# Patient Record
Sex: FEMALE | Race: WHITE | NOT HISPANIC OR LATINO | URBAN - METROPOLITAN AREA
[De-identification: names, ages, dates, MRNs, and addresses within clinical notes are randomized per-mention and may not be internally consistent; named-entity substitution may affect disease eponyms.]

---

## 2022-02-24 ENCOUNTER — OFFICE VISIT (OUTPATIENT)
Dept: GASTROENTEROLOGY | Facility: CLINIC | Age: 67
End: 2022-02-24
Payer: COMMERCIAL

## 2022-02-24 VITALS
HEIGHT: 66 IN | DIASTOLIC BLOOD PRESSURE: 78 MMHG | SYSTOLIC BLOOD PRESSURE: 126 MMHG | BODY MASS INDEX: 22.79 KG/M2 | WEIGHT: 141.8 LBS

## 2022-02-24 DIAGNOSIS — Z12.11 SCREENING FOR COLON CANCER: ICD-10-CM

## 2022-02-24 DIAGNOSIS — K57.20 PERFORATED DIVERTICULUM OF LARGE INTESTINE: Primary | ICD-10-CM

## 2022-02-24 DIAGNOSIS — C15.9 ESOPHAGEAL ADENOCARCINOMA (HCC): ICD-10-CM

## 2022-02-24 PROCEDURE — 99214 OFFICE O/P EST MOD 30 MIN: CPT | Performed by: REGISTERED NURSE

## 2022-02-24 RX ORDER — APIXABAN 5 MG/1
5 TABLET, FILM COATED ORAL 2 TIMES DAILY
COMMUNITY
Start: 2022-01-26

## 2022-02-24 RX ORDER — POLYETHYLENE GLYCOL 3350 17 G/17G
17 POWDER, FOR SOLUTION ORAL DAILY
COMMUNITY

## 2022-02-24 RX ORDER — POTASSIUM CHLORIDE 750 MG/1
40 CAPSULE, EXTENDED RELEASE ORAL DAILY
COMMUNITY
Start: 2021-12-06

## 2022-02-24 RX ORDER — ACETAMINOPHEN 325 MG/1
TABLET ORAL
COMMUNITY
Start: 2021-12-06

## 2022-02-24 RX ORDER — SUCRALFATE 1 G/1
1 TABLET ORAL
COMMUNITY
Start: 2022-02-22 | End: 2022-03-28 | Stop reason: SDUPTHER

## 2022-02-24 NOTE — PROGRESS NOTES
[id number] - Outpatient Follow-up Note  Saira Kauffman 74 y o  female MRN: [id number]  Encounter: [id number]    ASSESSMENT AND PLAN:      2  Esophageal adenocarcinoma ([address] )  Diagnosed by biopsy during EGD  She is currently being followed by [facility] receiving chemotherapy as well as radiation  Repeat scan a couple months after treatment is finished  EGD recall in about a year from now  Patient denies any dysphagia, odynophagia  Her appetite is good her weight is stable  If potential for surgery in the future may have to consider PEG however at current situation this is not necessary  2  Perforated diverticulum of large intestine  Status post Hay procedure 10/30/2021  She does have a colostomy with hopes for reversal in the future  She will need a colonoscopy prior to reversal         3  Screening for colon cancer  Previous colonoscopy in 2007  No colonoscopy is there after  She will need a colonoscopy eventually prior to colostomy reversal       Followup Appointment:  3 months  ______________________________________________________________________    Chief Complaint   Patient presents with    Follow-up hospital -  bleeding ulcer     HPI:  58-year-old female presents for follow-up [facility]   She was admitted the end of October/2021 with perforated diverticulitis of the sigmoid  She is status post Hay procedure with a colostomy  She was started on 5 mg Eliquis and it was noted she needed to be on for 6 months and then possible transition to 2 5 mg afterwards  11/16 she was admitted to the hospital with anemia and hematemesis in the setting of a PE  Hemoglobin at that time was 6 1  She had an EGD on 11/23 that showed an esophageal mass and long segment Barretts  Biopsies were performed that showed adenocarcinoma  Patient is being followed by [facility]    She is to receive 28 sessions of radiation total   She is currently at session 20  She is also getting chemotherapy once weekly  She will have her 5th out of 6 chemo treatment tomorrow  She will then have a repeat scan few months later  In regards to the perforated diverticulitis she was told that she will be able to have a colostomy reversal however it is important to finish the treatment for the esophageal mass 1st   Discussed with patient that we will want to do a recall EGD in about a year and she will need a colonoscopy prior to reversal of colostomy  Patient states that her appetite is good and her weight has stayed stable  She denies any dysphagia, odynophagia, nausea, vomiting  She denies any abdominal pain  She states that the stools in her colostomy are soft and slight liquid with MiraLax titrated      Historical Information   Past Medical History:   Diagnosis Date    Diverticulitis of colon     DVT (deep venous thrombosis) (HCC)     Esophageal cancer (HCC)     Esophageal ulcer     Pulmonary embolism (HCC)      Past Surgical History:   Procedure Laterality Date    COLOSTOMY      MYOMECTOMY       Social History     Substance and Sexual Activity   Alcohol Use Not Currently     Social History     Substance and Sexual Activity   Drug Use Not on file     Social History     Tobacco Use   Smoking Status Former Smoker   Smokeless Tobacco Never Used     Family History   Problem Relation Age of Onset    Cancer Mother     Stroke Father     Dementia Father     Colon cancer Neg Hx     Colon polyps Neg Hx          Current Outpatient Medications:     acetaminophen (Tylenol) 325 mg tablet    Eliquis 5 MG    magnesium oxide (MAG-OX) 400 mg    metoprolol tartrate (LOPRESSOR) 25 mg tablet    metoprolol tartrate (LOPRESSOR) 25 mg tablet    Pantoprazole Sodium (PROTONIX PO)    polyethylene glycol (GLYCOLAX) 17 GM/SCOOP powder    potassium chloride (MICRO-K) 10 MEQ CR capsule    sucralfate (CARAFATE) 1 g tablet  No Known Allergies  Reviewed medications and allergies and updated as indicated    PHYSICAL EXAM:    Blood pressure 126/78, height 5' 6" (1 676 m), weight 64 3 kg (141 lb 12 8 oz)  Body mass index is 22 89 kg/m²  General Appearance: NAD, cooperative, alert  Eyes: Anicteric, PERRLA, EOMI  ENT:  Normocephalic, atraumatic, normal mucosa  Neck:  Supple, symmetrical, trachea midline  Resp:  Clear to auscultation bilaterally; no rales, rhonchi or wheezing; respirations unlabored   CV:  S1 S2, Regular rate and rhythm; no murmur, rub, or gallop  GI:  Soft, non-tender, non-distended; normal bowel sounds; no masses, no organomegaly   Rectal: Deferred  Musculoskeletal: No cyanosis, clubbing or edema  Normal ROM  Skin:  No jaundice, rashes, or lesions   Heme/Lymph: No palpable cervical lymphadenopathy  Psych: Normal affect, good eye contact  Neuro: No gross deficits, AAOx3    Lab Results:   Reviewed    Radiology Results:   No results found

## 2022-03-28 ENCOUNTER — TELEPHONE (OUTPATIENT)
Dept: GASTROENTEROLOGY | Facility: CLINIC | Age: 67
End: 2022-03-28

## 2022-03-28 DIAGNOSIS — K21.9 GASTROESOPHAGEAL REFLUX DISEASE, UNSPECIFIED WHETHER ESOPHAGITIS PRESENT: Primary | ICD-10-CM

## 2022-03-28 RX ORDER — SUCRALFATE 1 G/1
1 TABLET ORAL
Qty: 120 TABLET | Refills: 5 | Status: SHIPPED | OUTPATIENT
Start: 2022-03-28

## 2022-03-28 NOTE — TELEPHONE ENCOUNTER
Pt's , Ruma, left  mss asking if she can refill Sucralfate?/just saw Kael Ordonez   # [id number]  Rx did not go through

## 2022-03-30 NOTE — TELEPHONE ENCOUNTER
Pt's , Ruma, left [facility] asking for [phone] to f/u on req to renew med; called a couple days ago  Ret'd call; advised Rx for Sucralfate was sent to [facility] 3/28

## 2022-04-22 ENCOUNTER — TELEPHONE (OUTPATIENT)
Dept: GASTROENTEROLOGY | Facility: CLINIC | Age: 67
End: 2022-04-22

## 2022-04-22 DIAGNOSIS — K21.9 GASTROESOPHAGEAL REFLUX DISEASE, UNSPECIFIED WHETHER ESOPHAGITIS PRESENT: Primary | ICD-10-CM

## 2022-04-22 RX ORDER — PANTOPRAZOLE SODIUM 40 MG/1
40 TABLET, DELAYED RELEASE ORAL DAILY
Qty: 90 TABLET | Refills: 3 | Status: SHIPPED | OUTPATIENT
Start: 2022-04-22 | End: 2022-07-21

## 2022-04-22 NOTE — TELEPHONE ENCOUNTER
I spoke with patient, she would like a 90 day supply  Last seen in [facility] December 2021  Last seen 2/24/22 and her follow up appointment is 5/23/22 with Crow Salcido

## 2022-04-22 NOTE — TELEPHONE ENCOUNTER
Pt's , Ruma, states when she was in [address] [place] [address] she rec'd Rx from surgeon Dr Steve Nicholas for Pantoprazole 40 mg once daily  She is almost out; requests new Rx from GI to /aCm  If Mumtaz Ly # [id number]

## 2022-05-23 ENCOUNTER — OFFICE VISIT (OUTPATIENT)
Dept: GASTROENTEROLOGY | Facility: CLINIC | Age: 67
End: 2022-05-23
Payer: COMMERCIAL

## 2022-05-23 VITALS
SYSTOLIC BLOOD PRESSURE: 120 MMHG | BODY MASS INDEX: 22.5 KG/M2 | DIASTOLIC BLOOD PRESSURE: 72 MMHG | HEIGHT: 66 IN | HEART RATE: 50 BPM | WEIGHT: 140 LBS

## 2022-05-23 DIAGNOSIS — C15.9 ESOPHAGEAL ADENOCARCINOMA (HCC): Primary | ICD-10-CM

## 2022-05-23 DIAGNOSIS — K57.20 PERFORATED DIVERTICULUM OF LARGE INTESTINE: ICD-10-CM

## 2022-05-23 DIAGNOSIS — Z12.11 SCREENING FOR COLON CANCER: ICD-10-CM

## 2022-05-23 PROCEDURE — 99214 OFFICE O/P EST MOD 30 MIN: CPT | Performed by: REGISTERED NURSE

## 2022-05-23 NOTE — PROGRESS NOTES
[id number] - Outpatient Follow-up Note  Joellen Zuniga 69 y o  female MRN: [id number]  Encounter: [id number]    ASSESSMENT AND PLAN:      1  Esophageal adenocarcinoma ([address] )  Diagnosed by biopsy during EGD  She is currently being followed by [facility] and she is status post chemo and radiation  Repeat PET scan 04/13/2022 showed resolution of prior hypermetabolic esophageal mass  Mild residual likely post radiation change of the esophagus  No evidence of local or distant metastatic disease  She has appointment 12 with advanced endoscopist her Azeb for re-evaluation  Patient denies any dysphagia, odynophagia  Her appetite is good her weight is stable  2  Perforated diverticulum of large intestine  Status post Hay procedure 10/30/2021  She does have a colostomy with hopes for reversal in the future  She will need a colonoscopy prior to reversal       3  Screening for colon cancer  Previous colonoscopy 2007  She will need a colonoscopy eventually prior to reversal   She would like complete workup for esophageal adenocarcinoma 1st         Followup Appointment:  6 months  ______________________________________________________________________    Chief Complaint   Patient presents with    3 month follow up     HPI:  68-year-old female presents for follow-up  She was admitted in the hospital 10/2021 with perforated diverticulitis of the sigmoid  Status post [facility] procedure with colostomy  She also has history of esophageal adenocarcinoma  She is status post chemo radiation  Most recent PET scan showed resolution  Followup appointment with advanced endoscopist from [facility] 6/15 to schedule EGD to further evaluate  In regards to perforated diverticulitis she was told she will be able to have a colostomy reversal however she would like to finish workup for esophageal adenocarcinoma before proceeding  Patient states that her appetite is good    She has actually been able to gain a couple of pounds  She denies any dysphagia, odynophagia, nausea, vomiting  She denies any abdominal pain  Stools are colostomy are soft and slightly liquid      Historical Information   Past Medical History:   Diagnosis Date    Diverticulitis of colon     DVT (deep venous thrombosis) (HCC)     Esophageal cancer (HCC)     Esophageal ulcer     Pulmonary embolism (HCC)      Past Surgical History:   Procedure Laterality Date    ABDOMINAL SURGERY  10/31/2021    Colostomy    COLOSTOMY      ENDOSCOPIC ULTRASOUND (UPPER)  1/7/2022    HYSTERECTOMY  10/31/2021    MYOMECTOMY      UPPER GASTROINTESTINAL ENDOSCOPY  11/6/2021     Social History     Substance and Sexual Activity   Alcohol Use Not Currently    Alcohol/week: 10 0 standard drinks    Types: 10 Shots of liquor per week     Social History     Substance and Sexual Activity   Drug Use Never     Social History     Tobacco Use   Smoking Status Former Smoker    Packs/day: 0 50    Years: 10 00    Pack years: 5 00    Types: Cigarettes    Start date: 1/1/2002   Raymond Chaim Quit date: 1/1/2012    Years since quitting: 10 3   Smokeless Tobacco Never Used     Family History   Problem Relation Age of Onset    Cancer Mother     Stroke Father     Dementia Father     Stomach cancer Maternal Grandfather     Colon cancer Neg Hx     Colon polyps Neg Hx          Current Outpatient Medications:     acetaminophen (TYLENOL) 325 mg tablet    Eliquis 5 MG    magnesium oxide (MAG-OX) 400 mg    metoprolol tartrate (LOPRESSOR) 25 mg tablet    metoprolol tartrate (LOPRESSOR) 25 mg tablet    pantoprazole (Protonix) 40 mg tablet    polyethylene glycol (GLYCOLAX) 17 GM/SCOOP powder    potassium chloride (MICRO-K) 10 MEQ CR capsule    sucralfate (CARAFATE) 1 g tablet  No Known Allergies  Reviewed medications and allergies and updated as indicated    PHYSICAL EXAM:    Blood pressure 120/72, pulse (!) 50, height 5' 6" (1 676 m), weight 63 5 kg (140 lb)  Body mass index is 22 6 kg/m²  General Appearance: NAD, cooperative, alert  Eyes: Anicteric, PERRLA, EOMI  ENT:  Normocephalic, atraumatic, normal mucosa  Neck:  Supple, symmetrical, trachea midline  Resp:  Clear to auscultation bilaterally; no rales, rhonchi or wheezing; respirations unlabored   CV:  S1 S2, Regular rate and rhythm; no murmur, rub, or gallop  GI:  Soft, non-tender, non-distended; normal bowel sounds; no masses, no organomegaly   Rectal: Deferred  Musculoskeletal: No cyanosis, clubbing or edema  Normal ROM  Skin:  No jaundice, rashes, or lesions   Heme/Lymph: No palpable cervical lymphadenopathy  Psych: Normal affect, good eye contact  Neuro: No gross deficits, AAOx3    Lab Results:   Results reviewed    Radiology Results:   No results found

## 2022-06-28 ENCOUNTER — TELEPHONE (OUTPATIENT)
Dept: GASTROENTEROLOGY | Facility: CLINIC | Age: 67
End: 2022-06-28

## 2022-06-28 NOTE — TELEPHONE ENCOUNTER
Called Pt's  per HB req - advised she would recommend going off of other doctor's recommendation/does not have reports to review  He will attempt to send report for Luann's review  Provided our fax #

## 2022-06-28 NOTE — TELEPHONE ENCOUNTER
Pt's , Ruma, states she had an endoscopy this morn at [facility] by Dr Amparo Espinal and he saw irritation in her esophagus/recommended increasing Pantoprazole dose; he wants to run by Luann/make sure she agrees w/ recommendation  # [phone]

## 2022-06-29 NOTE — TELEPHONE ENCOUNTER
Jorge Kong: Pt's , Ruma, states he has attempted to send fax of [place] proc to you four times-fax is not going through  I offered to give him the Nursing fax# but he said he will drop a copy off next Thur, 7/7  If you have ques for him or want to see a copy before then he asks you to call him at # [phone]

## 2022-08-29 ENCOUNTER — TELEPHONE (OUTPATIENT)
Dept: GASTROENTEROLOGY | Facility: CLINIC | Age: 67
End: 2022-08-29

## 2022-08-29 NOTE — TELEPHONE ENCOUNTER
----- Message from Merritt So sent at 8/29/2022  1:44 PM EDT -----  Regarding: Pantoprazole Prescription  Luann,  I have just tried to get a refill for this prescription and have been told that it cannot be refilled for another couple of weeks  The problem appears to be that my dose was increased to twice per day, but the prescription is for once per day  The pharmacy needs a new prescription to reflect the change in dosage  I have enough for the next couple of days only    Thank you  Cinthya Martinez  B/ana maria 7 May 1955

## 2022-08-31 ENCOUNTER — TELEPHONE (OUTPATIENT)
Dept: GASTROENTEROLOGY | Facility: CLINIC | Age: 67
End: 2022-08-31

## 2022-08-31 DIAGNOSIS — K20.90 ESOPHAGITIS: Primary | ICD-10-CM

## 2022-08-31 RX ORDER — PANTOPRAZOLE SODIUM 40 MG/1
40 TABLET, DELAYED RELEASE ORAL
Qty: 60 TABLET | Refills: 5 | Status: SHIPPED | OUTPATIENT
Start: 2022-08-31

## 2022-08-31 NOTE — TELEPHONE ENCOUNTER
----- Message from Merritt So sent at 8/31/2022  1:56 PM EDT -----  Regarding: Pantoprazole Rx  Luann,  I dropped off my EGD reports yesterday (the original one from January and the post-treatment one from July)  Can you issue a new prescription, or do you want me to go back to one per day instead of two?

## 2022-12-20 ENCOUNTER — OFFICE VISIT (OUTPATIENT)
Dept: GASTROENTEROLOGY | Facility: CLINIC | Age: 67
End: 2022-12-20

## 2022-12-20 VITALS
WEIGHT: 148 LBS | DIASTOLIC BLOOD PRESSURE: 74 MMHG | SYSTOLIC BLOOD PRESSURE: 116 MMHG | BODY MASS INDEX: 23.78 KG/M2 | HEIGHT: 66 IN

## 2022-12-20 DIAGNOSIS — Z98.890 HISTORY OF COLONOSCOPY: ICD-10-CM

## 2022-12-20 DIAGNOSIS — C15.9 MALIGNANT NEOPLASM OF ESOPHAGUS, UNSPECIFIED LOCATION (HCC): Primary | ICD-10-CM

## 2022-12-20 DIAGNOSIS — K57.20 PERFORATION OF SIGMOID COLON DUE TO DIVERTICULITIS: ICD-10-CM

## 2022-12-20 DIAGNOSIS — K21.9 GASTROESOPHAGEAL REFLUX DISEASE WITHOUT ESOPHAGITIS: ICD-10-CM

## 2022-12-20 RX ORDER — MELATONIN
1000 DAILY
COMMUNITY

## 2022-12-20 RX ORDER — LEVOTHYROXINE SODIUM 0.03 MG/1
25 TABLET ORAL DAILY
COMMUNITY

## 2022-12-21 NOTE — PROGRESS NOTES
[id number] - Outpatient Follow-up Note  Landen Bush 69 y o  female MRN: [id number]  Encounter: [id number]    ASSESSMENT AND PLAN:      1  Gastroesophageal reflux disease  Patient states she had been placed on Protonix 40 mg daily after EGD completed at [facility] in 2021  Previous office visit increased Protonix to twice daily, symptoms resolved  Patient would like to possibly wean from twice daily to daily after the new year  Discussed weaning process with the patient and her   2   Esophageal cancer  Patient was diagnosed by biopsy during EGD  Currently followed by [facility]  Chemo from January through May of this year  Repeat PET scan 4/13/2022 showed resolution of prior esophageal mass  However patient does state she was diagnosed with left lobe liver cancer and has been going chemotherapy the past 6 months  Patient denies any dysphagia  Her appetite is good and her weight is stable  3   Perforation of sigmoid colon due to diverticulitis  Patient underwent Hay procedure 10/2021 at [facility] due to perforated diverticulitis of the sigmoid colon  Patient states no locations with left abdomen colostomy  Long-term plan is to discuss reversal once patient wears current emo therapy process  4   History of colonoscopy  Patient states her last colonoscopy was 2007  To discuss colonoscopy prior to reversal possibly with next office visit  Followup Appointment:  3 Months  ______________________________________________________________________    Chief Complaint   Patient presents with   • 6 Month follow up-GERD, esophageal adenocarcinoma     HPI: 68-year-old female accompanied by her  Simon Holter with past medical history of GERD, esophageal ulcer, esophageal cancer, PE/DVT and perforated diverticulitis of the sigmoid colon which subsequently led to [facility] procedure with left abdomen colostomy    Patient states that she underwent chemotherapy from January to May of this year  She recently was diagnosed with left lobe liver cancer and has been undergoing chemotherapy for the past 6 months  On exam, patient denies nausea, vomiting or abdominal pain  Her weight is stable  Denies acid reflux symptoms  Patient states her ostomy has been functioning well  He does follow with [facility]  She denies any blood to ostomy site or pouch or melena  Patient states it has been discussed to have a colonoscopy prior to reversal however patient prefers to defer any procedures until current chemotherapy treatment is finished      Historical Information   Past Medical History:   Diagnosis Date   • Diverticulitis of colon    • DVT (deep venous thrombosis) (HCC)    • Esophageal cancer (HCC)    • Esophageal ulcer    • Pulmonary embolism (HCC)      Past Surgical History:   Procedure Laterality Date   • ABDOMINAL SURGERY  10/31/2021    Colostomy   • COLOSTOMY     • ENDOSCOPIC ULTRASOUND (UPPER)  1/7/2022   • HYSTERECTOMY  10/31/2021   • MYOMECTOMY     • UPPER GASTROINTESTINAL ENDOSCOPY  11/6/2021     Social History     Substance and Sexual Activity   Alcohol Use Not Currently   • Alcohol/week: 10 0 standard drinks   • Types: 10 Shots of liquor per week     Social History     Substance and Sexual Activity   Drug Use Never     Social History     Tobacco Use   Smoking Status Former   • Packs/day: 0 50   • Years: 10 00   • Pack years: 5 00   • Types: Cigarettes   • Start date: 1/1/2002   • Quit date: 1/1/2012   • Years since quitting: 10 9   Smokeless Tobacco Never     Family History   Problem Relation Age of Onset   • Cancer Mother    • Stroke Father    • Dementia Father    • Stomach cancer Maternal Grandfather    • Colon cancer Neg Hx    • Colon polyps Neg Hx          Current Outpatient Medications:   •  cholecalciferol (VITAMIN D3) 1,000 units tablet  •  Eliquis 5 MG  •  levothyroxine (Euthyrox) 25 mcg tablet  •  magnesium oxide (MAG-OX) 400 mg  •  metoprolol tartrate (LOPRESSOR) 25 mg tablet  •  pantoprazole (PROTONIX) 40 mg tablet  •  polyethylene glycol (GLYCOLAX) 17 GM/SCOOP powder  •  potassium chloride (MICRO-K) 10 MEQ CR capsule  •  sucralfate (CARAFATE) 1 g tablet  •  acetaminophen (TYLENOL) 325 mg tablet  •  metoprolol tartrate (LOPRESSOR) 25 mg tablet  No Known Allergies  Reviewed medications and allergies and updated as indicated    PHYSICAL EXAM:    Blood pressure 116/74, height 5' 6" (1 676 m), weight 67 1 kg (148 lb)  Body mass index is 23 89 kg/m²  General Appearance: NAD, cooperative, alert  Eyes: Anicteric, PERRLA, EOMI  ENT:  Normocephalic, atraumatic, normal mucosa  Neck:  Supple, symmetrical, trachea midline  Resp:  Clear to auscultation bilaterally; no rales, rhonchi or wheezing; respirations unlabored   CV:  S1 S2, Regular rate and rhythm; no murmur, rub, or gallop  GI:  Soft, non-tender, non-distended; normal bowel sounds; no masses, no organomegaly, left abdomen colostomy   Rectal: Deferred  Musculoskeletal: No cyanosis, clubbing or edema  Normal ROM  Skin:  No jaundice, rashes, or lesions   Heme/Lymph: No palpable cervical lymphadenopathy  Psych: Normal affect, good eye contact  Neuro: No gross deficits, AAOx3    Lab Results:   No results found for: WBC, HGB, HCT, MCV, PLT  No results found for: NA, K, CL, CO2, ANIONGAP, BUN, CREATININE, GLUCOSE, GLUF, CALCIUM, CORRECTEDCA, AST, ALT, ALKPHOS, PROT, BILITOT, EGFR  No results found for: IRON, TIBC, FERRITIN  No results found for: LIPASE    Radiology Results:   No results found

## 2023-01-24 ENCOUNTER — TELEPHONE (OUTPATIENT)
Dept: GASTROENTEROLOGY | Facility: CLINIC | Age: 68
End: 2023-01-24

## 2023-01-24 NOTE — TELEPHONE ENCOUNTER
OA Questions for EGD  Date: [1/24/23  ]  Screened by: Yani Ray  ]     Referring Provider: [  ]     Pre-Screening: BMI [ 23 89 ]    Past EGD? If yes - Date: [ ?  ]  Physician/Facility: [ [facility]  ]  Reason: Shelly Smith   ]     SCHEDULING STAFF: If the patient is over 76years old, please schedule an office visit  ·      Does the patient want to see a gastroenterologist prior to their procedure to discuss any GI symptoms? NO  ·      Has the patient been hospitalized or had abdominal surgery in the past 6 months? NO  ·      Does the patient use supplemental oxygen? NO  ·      Does the patient take [Coumadin], [Lovenox], [Plavix], [Eliquis], [Xarelto], or other blood thinning medication? YES [No]  ·      Has the patient had a stroke, cardiac event, or stent placed in the past year? [No]      PT IS ON ELIQUIS-SCHEDULE FOR OV    SCHEDULING STAFF: If patient answers NO to the above questions, then schedule the procedure  If patient answers YES to any of the above questions, then schedule an office appointment  ·       If a repeat EGD is belated and patient declines procedure à notify provider

## 2023-03-02 ENCOUNTER — TELEPHONE (OUTPATIENT)
Dept: GASTROENTEROLOGY | Facility: CLINIC | Age: 68
End: 2023-03-02

## 2023-03-02 NOTE — TELEPHONE ENCOUNTER
Scheduled date of EGD(as of today):03/28/2023  Physician performing EGD:dr Ashley Roegrs  Location of [facility]  Instructions reviewed with patient by:james  Clearances: eliquis

## 2023-03-02 NOTE — TELEPHONE ENCOUNTER
1st call-tried reaching pt on both numbers to schedule egd without ov per Dr Rosanna Esquivel  Pt's voice mailbox is full could not lvm on cell  Tried home # listed phone just rang   Pt is scheduled for ov already-pt will be scheduling recall egd at that time

## 2023-03-02 NOTE — TELEPHONE ENCOUNTER
----- Message from Mikey Smith DO sent at 2/20/2023  4:32 PM EST -----  Patient is currently admitted to [facility] and we are seeing her in consult  Her  says she is due for surveillance EGD  We discussed holding anticoagulant preprocedure  Okay to arrange EGD without an office visit, but will need cardiac clearance from Dr Nicole Velasquez to hold Eliquis

## 2023-03-02 NOTE — TELEPHONE ENCOUNTER
Pt sched for recall egd 3/28 GV-ok per dr Gastelum Para to sched egd without clerance ov  Pt on eliquis-get clearance from Kurtiser/Julius

## 2023-03-07 ENCOUNTER — TELEPHONE (OUTPATIENT)
Dept: OTHER | Facility: OTHER | Age: 68
End: 2023-03-07

## 2023-03-07 NOTE — TELEPHONE ENCOUNTER
Pt was in [facility] 2/17-2/22/23 with neutropenia with fever, anemia, esophageal adenocarcinoma, campylobacter enteritis and sepsis  Your discharge summary note states diet as tolerated and ok to drink protein drinks from home  Dr Aurelia Colbert addendum the same day says ok to discharge per GI and continue diet as tolerated  Please advise

## 2023-03-07 NOTE — TELEPHONE ENCOUNTER
Shannon Chaudhry at Dr Shauna Stewart office as well as the pt and her  were notified of Carmen's recommendation

## 2023-03-08 ENCOUNTER — TELEPHONE (OUTPATIENT)
Dept: GASTROENTEROLOGY | Facility: CLINIC | Age: 68
End: 2023-03-08

## 2023-03-08 DIAGNOSIS — C15.9 ESOPHAGEAL ADENOCARCINOMA (HCC): Primary | ICD-10-CM

## 2023-03-08 RX ORDER — SUCRALFATE 1 G/1
1 TABLET ORAL 2 TIMES DAILY
Qty: 60 TABLET | Refills: 1 | Status: SHIPPED | OUTPATIENT
Start: 2023-03-08 | End: 2023-04-07

## 2023-03-08 NOTE — TELEPHONE ENCOUNTER
GI Physician: Dr Cornelio Bence    Refill Request for Medication: sucralfate    Dose: 1g    Quantity: 120    [facility]    Pt's  would like to know why this medication has been cancelled and would like a new prescription sent to the pharmacy

## 2023-03-14 ENCOUNTER — PREP FOR PROCEDURE (OUTPATIENT)
Dept: GASTROENTEROLOGY | Facility: CLINIC | Age: 68
End: 2023-03-14

## 2023-03-14 DIAGNOSIS — K21.9 GASTROESOPHAGEAL REFLUX DISEASE WITHOUT ESOPHAGITIS: Primary | ICD-10-CM

## 2023-03-16 ENCOUNTER — TELEPHONE (OUTPATIENT)
Dept: GASTROENTEROLOGY | Facility: CLINIC | Age: 68
End: 2023-03-16

## 2023-03-21 ENCOUNTER — TELEPHONE (OUTPATIENT)
Dept: GASTROENTEROLOGY | Facility: CLINIC | Age: 68
End: 2023-03-21

## 2023-03-21 NOTE — TELEPHONE ENCOUNTER
Patients GI provider:  Nelson Alcaraz     Number to return call: [phone]    Reason for call: Adonay Suero from [facility] called in regards to Pt's R Richie Zaldivar 9  She states she was told by Pt that they can stop taking this medication per GI's instructions  She wants to make sure this is correct  She can be reached at number above       Scheduled procedure/appointment date if applicable: N/A

## 2023-03-21 NOTE — TELEPHONE ENCOUNTER
Pt was in [facility] 2/17-2/22 for neutropenia with fever, anemia, esophageal adenocarcinoma, campylobacter enteritis and sepsis  The consult done 2/22 notes to continue Carafate and Pantoprazole  May pt stop taking Carafate if not effective? Please advise regarding Carafate directions    Thanks

## 2023-03-21 NOTE — TELEPHONE ENCOUNTER
Spoke with Farida Wing, notified her of Birgit's recommendation  Pt did note she did not feel was necessary

## 2023-03-30 DIAGNOSIS — K20.90 ESOPHAGITIS: ICD-10-CM

## 2023-03-30 RX ORDER — PANTOPRAZOLE SODIUM 40 MG/1
40 TABLET, DELAYED RELEASE ORAL
Qty: 60 TABLET | Refills: 5 | Status: SHIPPED | OUTPATIENT
Start: 2023-03-30

## 2023-05-16 ENCOUNTER — TELEPHONE (OUTPATIENT)
Dept: GASTROENTEROLOGY | Facility: CLINIC | Age: 68
End: 2023-05-16

## 2023-05-16 ENCOUNTER — PREP FOR PROCEDURE (OUTPATIENT)
Dept: GASTROENTEROLOGY | Facility: CLINIC | Age: 68
End: 2023-05-16

## 2023-05-16 DIAGNOSIS — C15.9 ESOPHAGEAL ADENOCARCINOMA (HCC): Primary | ICD-10-CM

## 2023-05-16 NOTE — TELEPHONE ENCOUNTER
Scheduled date of EGD(as of today): 5/30/23  Physician performing EGD: Dr Starr   Location of EGD: [facility]  Instructions reviewed with patient at visit  Clearances: told to stop Eliquis 2 days prior

## 2023-08-14 ENCOUNTER — TELEPHONE (OUTPATIENT)
Age: 68
End: 2023-08-14

## 2023-08-14 ENCOUNTER — PREP FOR PROCEDURE (OUTPATIENT)
Age: 68
End: 2023-08-14

## 2023-08-14 DIAGNOSIS — C15.9 ESOPHAGEAL ADENOCARCINOMA (HCC): Primary | ICD-10-CM

## 2023-08-14 DIAGNOSIS — K21.9 GASTROESOPHAGEAL REFLUX DISEASE WITHOUT ESOPHAGITIS: ICD-10-CM

## 2023-08-14 NOTE — TELEPHONE ENCOUNTER
Scheduled date of EGD(as of today): 9/20/23  Physician performing EGD: Elodia Fernandes  Location of EGD: [facility]    NOTE: Pt undergoing Chemo E2W

## 2023-08-30 ENCOUNTER — TELEPHONE (OUTPATIENT)
Dept: GASTROENTEROLOGY | Facility: CLINIC | Age: 68
End: 2023-08-30

## 2023-08-30 NOTE — TELEPHONE ENCOUNTER
Spoke to patient's spouse, Ruma, to discuss EGD that was scheduled for 09/20/2032. Ruma stated that Romy was instructed to have a 3 month EGD, but no recalls were ordered from 05/30/23 EGD. He also said that Dr Lalo Jones from [address] [place] [address] Specialist requested EGD due to esophageal cancer. Obtained recent office notes from Cardiology and Oncology, as well as a CT scan for review. Does patient need to have an office visit prior to scheduling procedure? Also should patient schedule procedure at Hospital or ok to have [address]?

## 2023-08-30 NOTE — TELEPHONE ENCOUNTER
08/30/23  Screened by: Mauri Rhoades    Referring Provider     Pre- Screening: There is no height or weight on file to calculate BMI. Has patient been referred for a routine screening Colonoscopy? no  Is the patient between 43-73 years old? yes      Previous Colonoscopy yes   If yes:    Date:     Facility:     Reason:       SCHEDULING STAFF: If the patient is between 39yrs-51yrs, please advise patient to confirm benefits/coverage with their insurance company for a routine screening colonoscopy, some insurance carriers will only cover at [address] [place] [address] or older. If the patient is over 66years old, please schedule an office visit. Does the patient want to see a Gastroenterologist prior to their procedure OR are they having any GI symptoms? no    Has the patient been hospitalized or had abdominal surgery in the past 6 months? no    Does the patient use supplemental oxygen? no    Does the patient take Coumadin, Lovenox, Plavix, Elliquis, Xarelto, or other blood thinning medication? yes-Eliquis being held since 07/23/23 per Dr José Luis Luis    Has the patient had a stroke, cardiac event, or stent placed in the past year? no    SCHEDULING STAFF: If patient answers NO to above questions, then schedule procedure. If patient answers YES to above questions, then schedule office appointment. If patient is between 45yrs - 49yrs, please advise patient that we will have to confirm benefits & coverage with their insurance company for a routine screening colonoscopy.

## 2023-09-06 NOTE — TELEPHONE ENCOUNTER
Per Marcelle Recinos 10 mm balloon dilators available. Scheduled date of EGD(as of today):09/20/2023  Physician performing EGD: Dr Suzette Holland  Location of EGD: [facility]  Instructions emailed to patient by: ame  Clearances: eliquis has been held since 07/23/23 per Dr Edmund Mallory. Patient instructed to call office if there are any changes.

## 2023-09-15 ENCOUNTER — TELEPHONE (OUTPATIENT)
Dept: GASTROENTEROLOGY | Facility: CLINIC | Age: 68
End: 2023-09-15

## 2023-09-15 NOTE — TELEPHONE ENCOUNTER
Procedure confirmed  Endoscopy     Via: Spoke with patient. Instructions given: Email     Prep Given: N/A    Call the office if there are any questions. Confirmed with pt and her  that pt is still off her blood thinner.

## 2023-09-20 ENCOUNTER — HOSPITAL ENCOUNTER (OUTPATIENT)
Dept: GASTROENTEROLOGY | Facility: AMBULATORY SURGERY CENTER | Age: 68
Discharge: HOME | End: 2023-09-20
Payer: COMMERCIAL

## 2023-09-20 ENCOUNTER — ANESTHESIA (OUTPATIENT)
Dept: GASTROENTEROLOGY | Facility: AMBULATORY SURGERY CENTER | Age: 68
End: 2023-09-20

## 2023-09-20 ENCOUNTER — ANESTHESIA EVENT (OUTPATIENT)
Dept: GASTROENTEROLOGY | Facility: AMBULATORY SURGERY CENTER | Age: 68
End: 2023-09-20

## 2023-09-20 VITALS
SYSTOLIC BLOOD PRESSURE: 169 MMHG | BODY MASS INDEX: 20.83 KG/M2 | HEIGHT: 65 IN | OXYGEN SATURATION: 100 % | HEART RATE: 55 BPM | TEMPERATURE: 97.3 F | WEIGHT: 125 LBS | DIASTOLIC BLOOD PRESSURE: 68 MMHG | RESPIRATION RATE: 21 BRPM

## 2023-09-20 DIAGNOSIS — K22.2 ESOPHAGEAL STRICTURE: ICD-10-CM

## 2023-09-20 DIAGNOSIS — C15.9 MALIGNANT NEOPLASM OF ESOPHAGUS, UNSPECIFIED LOCATION (HCC): ICD-10-CM

## 2023-09-20 DIAGNOSIS — R13.19 ESOPHAGEAL DYSPHAGIA: ICD-10-CM

## 2023-09-20 DIAGNOSIS — K21.9 GASTROESOPHAGEAL REFLUX DISEASE WITHOUT ESOPHAGITIS: ICD-10-CM

## 2023-09-20 PROBLEM — I10 HTN (HYPERTENSION): Status: ACTIVE | Noted: 2023-09-20

## 2023-09-20 PROCEDURE — 43249 ESOPH EGD DILATION <30 MM: CPT | Performed by: INTERNAL MEDICINE

## 2023-09-20 RX ORDER — PROPOFOL 10 MG/ML
INJECTION, EMULSION INTRAVENOUS AS NEEDED
Status: DISCONTINUED | OUTPATIENT
Start: 2023-09-20 | End: 2023-09-20

## 2023-09-20 RX ORDER — SODIUM CHLORIDE 9 MG/ML
50 INJECTION, SOLUTION INTRAVENOUS CONTINUOUS
Status: DISCONTINUED | OUTPATIENT
Start: 2023-09-20 | End: 2023-09-24 | Stop reason: HOSPADM

## 2023-09-20 RX ADMIN — SODIUM CHLORIDE 50 ML/HR: 9 INJECTION, SOLUTION INTRAVENOUS at 10:50

## 2023-09-20 RX ADMIN — SODIUM CHLORIDE: 9 INJECTION, SOLUTION INTRAVENOUS at 11:03

## 2023-09-20 RX ADMIN — PROPOFOL 100 MG: 10 INJECTION, EMULSION INTRAVENOUS at 10:58

## 2023-09-20 NOTE — ANESTHESIA PREPROCEDURE EVALUATION
Procedure:  EGD    Relevant Problems   CARDIO   (+) HTN (hypertension)      GI/HEPATIC   (+) Esophageal adenocarcinoma ([address])      H/o PE    Physical Exam    Airway    Mallampati score: II  TM Distance: >3 FB  Neck ROM: full     Dental   Comment: edentulous,     Cardiovascular      Pulmonary      Other Findings        Anesthesia Plan  ASA Score- 3     Anesthesia Type- IV sedation with anesthesia with ASA Monitors. Additional Monitors:   Airway Plan:     Comment: Black tea at 05:30    Patient educated on the possibility for awareness under sedation and of the possibility of airway intervention in the event of an airway or procedural emergency  . Plan Factors-Exercise tolerance (METS): <4 METS. Chart reviewed. Patient summary reviewed. Patient is not a current smoker. Induction- intravenous. Postoperative Plan-     Informed Consent- Anesthetic plan and risks discussed with patient. I personally reviewed this patient with the CRNA. Discussed and agreed on the Anesthesia Plan with the CRNA. Julisa Osorio

## 2023-09-20 NOTE — H&P
History and Physical - [facility]  Debbie Huynh 77 y.o. female MRN: [id number]    HPI: Debbie Huynh is a 77y.o. year old female who presents for esophageal stricture, esophageal cancer    REVIEW OF SYSTEMS: Per the HPI, and otherwise unremarkable.     Historical Information   Past Medical History:   Diagnosis Date   • Diverticulitis of colon    • DVT (deep venous thrombosis) (HCC)    • Esophageal cancer (HCC)    • Esophageal ulcer    • Hypertension    • Pulmonary embolism (HCC)    • Tongue tied      Past Surgical History:   Procedure Laterality Date   • ABDOMINAL SURGERY  10/31/2021    Colostomy   • COLOSTOMY     • ENDOSCOPIC ULTRASOUND (UPPER)  2022   • HYSTERECTOMY  10/31/2021   • MYOMECTOMY     • PORTACATH PLACEMENT Right     chest   • UPPER GASTROINTESTINAL ENDOSCOPY  2021     Social History   Social History     Substance and Sexual Activity   Alcohol Use Not Currently   • Alcohol/week: 10.0 standard drinks of alcohol   • Types: 10 Shots of liquor per week     Social History     Substance and Sexual Activity   Drug Use Never     Social History     Tobacco Use   Smoking Status Former   • Packs/day: 0.50   • Years: 10.00   • Total pack years: 5.00   • Types: Cigarettes   • Start date: 2002   • Quit date: 2012   • Years since quittin.7   Smokeless Tobacco Never     Family History   Problem Relation Age of Onset   • Cancer Mother    • Stroke Father    • Dementia Father    • Stomach cancer Maternal Grandfather    • Colon cancer Neg Hx    • Colon polyps Neg Hx        Meds/Allergies       Current Outpatient Medications:   •  cholecalciferol (VITAMIN D3) 1,000 units tablet  •  levothyroxine (Euthyrox) 25 mcg tablet  •  magnesium oxide (MAG-OX) 400 mg  •  metoprolol tartrate (LOPRESSOR) 25 mg tablet  •  pantoprazole (PROTONIX) 40 mg tablet  •  potassium chloride (MICRO-K) 10 MEQ CR capsule  •  acetaminophen (TYLENOL) 325 mg tablet  •  Eliquis 5 MG  •  metoprolol tartrate (LOPRESSOR) 25 mg tablet  •  polyethylene glycol (GLYCOLAX) 17 GM/SCOOP powder  •  sucralfate (CARAFATE) 1 g tablet    Current Facility-Administered Medications:   •  sodium chloride 0.9 % infusion, 50 mL/hr, Intravenous, Continuous, Continue from Pre-op at 09/20/23 1056    No Known Allergies    Objective     /63   Pulse (!) 53   Temp (!) 97.3 °F (36.3 °C) (Temporal)   Resp 12   Ht 5' 5" (1.651 m)   Wt 56.7 kg (125 lb)   SpO2 100%   BMI 20.80 kg/m²     PHYSICAL EXAM    Gen: NAD AAOx3  Head: Normocephalic, Atraumatic  CV: S1S2 RRR no m/r/g  CHEST: Clear b/l no c/r/w  ABD: soft, +BS NT/ND  EXT: no edema    ASSESSMENT/PLAN:  This is a 77y.o. year old female here for EGD with dilatation, and she is stable and optimized for her procedure.

## 2023-09-20 NOTE — ANESTHESIA POSTPROCEDURE EVALUATION
Post-Op Assessment Note    CV Status:  Stable  Pain Score: 0    Pain management: adequate     Mental Status:  Alert and awake   Hydration Status:  Euvolemic   PONV Controlled:  Controlled   Airway Patency:  Patent      Post Op Vitals Reviewed: Yes      Staff: CRNA         No notable events documented.     BP   138/60   Temp  98   Pulse 60   Resp   16   SpO2   100

## 2023-09-25 DIAGNOSIS — K20.90 ESOPHAGITIS: ICD-10-CM

## 2023-09-25 RX ORDER — PANTOPRAZOLE SODIUM 40 MG/1
40 TABLET, DELAYED RELEASE ORAL
Qty: 60 TABLET | Refills: 0 | Status: SHIPPED | OUTPATIENT
Start: 2023-09-25

## 2023-10-23 ENCOUNTER — TELEPHONE (OUTPATIENT)
Dept: GASTROENTEROLOGY | Facility: CLINIC | Age: 68
End: 2023-10-23

## 2023-10-23 NOTE — TELEPHONE ENCOUNTER
Spoke to the patient and her significant other on speaker phone regarding scheduling patient's follow-up EGD which was requested from last EGD on 9/20. Also lab work to be obtained prior to procedure. I did tell patient that since they are using and outside laboratory to have the blood work done 10 days prior to the procedure and the request will be mailed to their house today. Refill for pantoprazole will be sent to their pharmacy at this time. It was noted that the patient has had a lower platelet count due to chemotherapy.

## 2023-10-23 NOTE — TELEPHONE ENCOUNTER
----- Message from Edmond Bal LPN sent at 08/51/9076 10:57 AM EDT -----  Regarding: FW: Pantoprazole Rx  Contact: [phone]    ----- Message -----  From: Harley Britt "Hanh Art"  Sent: 10/23/2023  10:52 AM EDT  To: [facility]  Subject: Pantoprazole Rx                                  I am out of refills for pantoprazole. Please re-issue.   Thanks

## 2023-10-23 NOTE — TELEPHONE ENCOUNTER
----- Message from [address] [place] [address] sent at 10/23/2023  2:55 PM EDT -----      Regarding: FW: Pantoprazole Rx  Contact: [phone]  Dr. Josiah Rebolledo requests follow-up EGD 2 months after previous which was 9/20. Patient requires CBC prior to procedure. Spoke to the  and [place] regarding rescheduling this procedure. Per patient's significant other she is no longer taking Eliquis.  Herm

## 2023-10-24 ENCOUNTER — PREP FOR PROCEDURE (OUTPATIENT)
Dept: GASTROENTEROLOGY | Facility: CLINIC | Age: 68
End: 2023-10-24

## 2023-10-24 DIAGNOSIS — R13.19 ESOPHAGEAL DYSPHAGIA: Primary | ICD-10-CM

## 2023-10-24 DIAGNOSIS — K22.2 ESOPHAGEAL STRICTURE: ICD-10-CM

## 2023-10-24 NOTE — TELEPHONE ENCOUNTER
Scheduled date of EGD(as of today):12/22/2023  Physician performing EGD:Dr Kemi Gregory  Location of EGD:[facility]  Instructions reviewed with patient by:james  Clearances: no

## 2023-10-25 DIAGNOSIS — K20.90 ESOPHAGITIS: ICD-10-CM

## 2023-10-25 RX ORDER — PANTOPRAZOLE SODIUM 40 MG/1
40 TABLET, DELAYED RELEASE ORAL
Qty: 60 TABLET | Refills: 4 | Status: SHIPPED | OUTPATIENT
Start: 2023-10-25

## 2023-12-08 ENCOUNTER — ANESTHESIA EVENT (OUTPATIENT)
Dept: ANESTHESIOLOGY | Facility: AMBULATORY SURGERY CENTER | Age: 68
End: 2023-12-08

## 2023-12-08 ENCOUNTER — ANESTHESIA (OUTPATIENT)
Dept: ANESTHESIOLOGY | Facility: AMBULATORY SURGERY CENTER | Age: 68
End: 2023-12-08

## 2023-12-21 ENCOUNTER — TELEPHONE (OUTPATIENT)
Age: 68
End: 2023-12-21

## 2023-12-21 ENCOUNTER — TELEPHONE (OUTPATIENT)
Dept: GASTROENTEROLOGY | Facility: CLINIC | Age: 68
End: 2023-12-21

## 2023-12-21 NOTE — TELEPHONE ENCOUNTER
Scheduled date of EGD(as of today):12/22/2023   Physician performing EGD:Dr Coppola  Location of EGD:[facility]  Patient has instructions  Clearances: no

## 2023-12-21 NOTE — TELEPHONE ENCOUNTER
Patients GI provider:  Dr. Casanova    Number to return call: [phone]     Reason for call: Spouse calling to check on arrival time for 12/22 procedure, said he was contacted to inform this needs to be done at the [facility]. EGD cancelled but not rescheduled. Reached out to the office directly for assistance. Office to contact spouse directly.     Scheduled procedure/appointment date if applicable: N/A

## 2023-12-22 ENCOUNTER — HOSPITAL ENCOUNTER (OUTPATIENT)
Dept: GASTROENTEROLOGY | Facility: HOSPITAL | Age: 68
Setting detail: OUTPATIENT SURGERY
Discharge: HOME | End: 2023-12-22
Attending: INTERNAL MEDICINE

## 2023-12-22 VITALS
TEMPERATURE: 97.5 F | HEART RATE: 51 BPM | OXYGEN SATURATION: 100 % | DIASTOLIC BLOOD PRESSURE: 76 MMHG | RESPIRATION RATE: 16 BRPM | SYSTOLIC BLOOD PRESSURE: 161 MMHG

## 2023-12-22 DIAGNOSIS — R13.19 ESOPHAGEAL DYSPHAGIA: ICD-10-CM

## 2023-12-22 DIAGNOSIS — K22.2 ESOPHAGEAL STRICTURE: ICD-10-CM

## 2023-12-22 PROBLEM — K94.19 ALTERED BOWEL ELIMINATION DUE TO INTESTINAL OSTOMY (HCC): Status: ACTIVE | Noted: 2021-10-31

## 2023-12-22 PROBLEM — K22.89 ESOPHAGEAL MASS: Status: ACTIVE | Noted: 2022-08-04

## 2023-12-22 PROBLEM — K22.10 ESOPHAGEAL ULCER: Status: ACTIVE | Noted: 2021-11-06

## 2023-12-22 RX ORDER — DEXAMETHASONE 4 MG/1
40 TABLET ORAL DAILY
COMMUNITY

## 2023-12-26 ENCOUNTER — TELEPHONE (OUTPATIENT)
Age: 68
End: 2023-12-26

## 2023-12-26 NOTE — TELEPHONE ENCOUNTER
Patients GI provider:  Dr. Casanova    Number to return call: [phone]    Reason for call: Pt's  calling requesting to R/S EGD that was cancelled due to platelet levels. Please call to R/S.     Scheduled procedure/appointment date if applicable: N/A

## 2023-12-27 ENCOUNTER — PREP FOR PROCEDURE (OUTPATIENT)
Dept: GASTROENTEROLOGY | Facility: CLINIC | Age: 68
End: 2023-12-27

## 2023-12-27 DIAGNOSIS — R13.19 ESOPHAGEAL DYSPHAGIA: Primary | ICD-10-CM

## 2023-12-27 DIAGNOSIS — K22.2 ESOPHAGEAL STRICTURE: ICD-10-CM

## 2023-12-27 NOTE — TELEPHONE ENCOUNTER
Scheduled date of EGD with dilation(as of today):01/04/2024  Physician performing EGD with dilation: [place]  Location of EGD with dilation:[facility]  Patient has instructions  Clearances: Patient has been off of Eliquis for  6 weeks    Patient needs bloodwork (CBC) the day prior to procedure to check platelets.

## 2023-12-27 NOTE — TELEPHONE ENCOUNTER
Patients  contacted office.  is stating patient was to be scheduled tomorrow for EGD and was to have labs ordered to check platelet count. Patient is not scheduled. Orders are not in.     Call transferred to office to further assist.

## 2024-01-29 ENCOUNTER — OFFICE VISIT (OUTPATIENT)
Dept: GASTROENTEROLOGY | Facility: CLINIC | Age: 69
End: 2024-01-29
Payer: COMMERCIAL

## 2024-01-29 VITALS
BODY MASS INDEX: 22.76 KG/M2 | DIASTOLIC BLOOD PRESSURE: 76 MMHG | SYSTOLIC BLOOD PRESSURE: 154 MMHG | WEIGHT: 136.6 LBS | HEIGHT: 65 IN

## 2024-01-29 DIAGNOSIS — K22.2 ESOPHAGEAL STRICTURE: ICD-10-CM

## 2024-01-29 DIAGNOSIS — Z93.3: Primary | ICD-10-CM

## 2024-01-29 DIAGNOSIS — D61.818 PANCYTOPENIA (HCC): ICD-10-CM

## 2024-01-29 DIAGNOSIS — C15.9 ESOPHAGEAL ADENOCARCINOMA (HCC): ICD-10-CM

## 2024-01-29 PROCEDURE — 99213 OFFICE O/P EST LOW 20 MIN: CPT | Performed by: STUDENT IN AN ORGANIZED HEALTH CARE EDUCATION/TRAINING PROGRAM

## 2024-01-29 RX ORDER — LORAZEPAM 0.5 MG/1
0.5 TABLET ORAL EVERY 12 HOURS PRN
COMMUNITY
Start: 2024-01-04

## 2024-02-02 ENCOUNTER — TELEPHONE (OUTPATIENT)
Age: 69
End: 2024-02-02

## 2024-02-02 NOTE — TELEPHONE ENCOUNTER
Patients GI provider:  Dr. Coto    Number to return call: [phone]     Reason for call: [facility] rep called and asked if we can please fax over last office note to fax # [phone], pt is currently in the office thank you     Scheduled procedure/appointment date if applicable: Appt 05/01/2024

## 2024-02-02 NOTE — TELEPHONE ENCOUNTER
2/2/2024 - Last office note faxed to [facility] at [phone]. Confirmation received. AMEE